# Patient Record
Sex: FEMALE | Race: WHITE | URBAN - METROPOLITAN AREA
[De-identification: names, ages, dates, MRNs, and addresses within clinical notes are randomized per-mention and may not be internally consistent; named-entity substitution may affect disease eponyms.]

---

## 2021-02-07 ENCOUNTER — EMERGENCY (EMERGENCY)
Facility: HOSPITAL | Age: 41
LOS: 1 days | Discharge: HOME | End: 2021-02-07
Attending: EMERGENCY MEDICINE | Admitting: EMERGENCY MEDICINE
Payer: MEDICAID

## 2021-02-07 VITALS
RESPIRATION RATE: 18 BRPM | SYSTOLIC BLOOD PRESSURE: 125 MMHG | TEMPERATURE: 98 F | OXYGEN SATURATION: 99 % | DIASTOLIC BLOOD PRESSURE: 76 MMHG | HEART RATE: 84 BPM

## 2021-02-07 DIAGNOSIS — R10.9 UNSPECIFIED ABDOMINAL PAIN: ICD-10-CM

## 2021-02-07 DIAGNOSIS — R11.10 VOMITING, UNSPECIFIED: ICD-10-CM

## 2021-02-07 DIAGNOSIS — Z88.6 ALLERGY STATUS TO ANALGESIC AGENT: ICD-10-CM

## 2021-02-07 PROCEDURE — 99053 MED SERV 10PM-8AM 24 HR FAC: CPT

## 2021-02-07 PROCEDURE — 99283 EMERGENCY DEPT VISIT LOW MDM: CPT

## 2021-02-07 PROCEDURE — 99282 EMERGENCY DEPT VISIT SF MDM: CPT

## 2021-02-07 RX ORDER — ONDANSETRON 8 MG/1
4 TABLET, FILM COATED ORAL ONCE
Refills: 0 | Status: DISCONTINUED | OUTPATIENT
Start: 2021-02-07 | End: 2021-02-11

## 2021-02-07 RX ORDER — SODIUM CHLORIDE 9 MG/ML
1000 INJECTION INTRAMUSCULAR; INTRAVENOUS; SUBCUTANEOUS ONCE
Refills: 0 | Status: DISCONTINUED | OUTPATIENT
Start: 2021-02-07 | End: 2021-02-11

## 2021-02-07 RX ORDER — ACETAMINOPHEN 500 MG
650 TABLET ORAL ONCE
Refills: 0 | Status: DISCONTINUED | OUTPATIENT
Start: 2021-02-07 | End: 2021-02-11

## 2021-02-07 NOTE — ED PROVIDER NOTE - OBJECTIVE STATEMENT
40 F pmh renal stones p/w abd pain x 3 days.  Pt reports L flank pain w/ radiation LLQ and one episodes vomiting this morning.  Pt also states she is pregnant and miscarrying although denies vaginal bleeding, states LMP 3 months ago.  Pt provides no further information as she becomes upset and tearful during questioning stating "why is everyone bothering me."  Denies f/c, dysuria/hematuria but rest of ROS unable to obtain 2/2 pt cooperation.

## 2021-02-07 NOTE — ED PROVIDER NOTE - ATTENDING CONTRIBUTION TO CARE
I saw and discussed the care of the pt directly with the ACP while the pt was in the ED. I have reviewed the ACP note and agree w/ the history, exam and plan of care. pt w/ full capacity. no indication for restraining/retrieving pt. no indication for emergent psych evaluation.    I saw and discussed the care of the pt directly with the ACP while the pt was in the ED. I have reviewed the ACP note and agree w/ the history, exam and plan of care.

## 2021-02-07 NOTE — ED PROVIDER NOTE - PHYSICAL EXAMINATION
Vitals reviewed  Gen: unkempt, pacing around, throwing belongings on ground, tearful  Skin: wwp, no rash/lesions  HEENT: ncat, eomi, mmm  CV: rrr, no audible m/r/g  Resp: symmetrical expansion, ctab, no w/r/r  Abd: nondistended, soft/nt  Ext: FROM throughout, no peripheral edema  Neuro: alert/oriented, no focal deficits, steady gait Vitals reviewed  Gen: unkempt, pacing around, throwing belongings on ground, tearful  Skin: wwp, erythematous malar rash to face   HEENT: ncat, eomi, mmm  CV: rrr, no audible m/r/g  Resp: symmetrical expansion, ctab, no w/r/r  Abd: nondistended, soft, nontender, no cvat   Ext: FROM throughout, no peripheral edema  Neuro: alert/oriented, no focal deficits, steady gait Vitals reviewed  Gen: unkempt, pacing around, throwing belongings on ground, tearful/yelling/agitated/uncooperative  Skin: wwp, erythematous malar rash to face   HEENT: ncat, eomi, mmm  CV: rrr, no audible m/r/g  Resp: symmetrical expansion, ctab, no w/r/r  Abd: nondistended, soft, nontender, no cvat   Ext: FROM throughout, no peripheral edema  Neuro: alert/oriented, no focal deficits, steady gait

## 2021-02-07 NOTE — ED ADULT NURSE REASSESSMENT NOTE - NS ED NURSE REASSESS COMMENT FT1
Pt was escorted out of hospital due to belligerent behavior, cursing and yelling in ED unit.  Pt ambulated with property in stable condition.

## 2021-02-07 NOTE — ED PROVIDER NOTE - PATIENT PORTAL LINK FT
You can access the FollowMyHealth Patient Portal offered by [facility] by registering at the following website: [email or web address]. By joining [facility]’s FollowMyHealth portal, you will also be able to view your health information using other applications (apps) compatible with our system.

## 2021-02-07 NOTE — ED PROVIDER NOTE - CLINICAL SUMMARY MEDICAL DECISION MAKING FREE TEXT BOX
40 F pmh renal stones p/w abd pain x 3 days. stating she has kidney stones and having miscarriage.  pt verbally abusive toward staff, throwing belongings on ground, refusing evaluation initially.  pt reassessed then more willing to be assessed.  after discussing need for urine/labs, pt agreeable and accepting tylenol/zofran.  pt then went to bathroom, locked herself in and multiple attempts to have pt come out she begins cursing from bathroom, accusing staff of harassing her.  security called and pt removed from  bathroom, cursing and using racial slurs.  pt in no acute distress, discharged and escorted out by security.

## 2021-02-07 NOTE — ED ADULT TRIAGE NOTE - ARRIVAL INFO ADDITIONAL COMMENTS
pt c/o known kidney stone, fetal demise which she is supposed to have surgery for.  c/o abd pain with n/v for last several days.